# Patient Record
Sex: FEMALE | Race: WHITE | NOT HISPANIC OR LATINO | Employment: OTHER | URBAN - NONMETROPOLITAN AREA
[De-identification: names, ages, dates, MRNs, and addresses within clinical notes are randomized per-mention and may not be internally consistent; named-entity substitution may affect disease eponyms.]

---

## 2020-09-01 ENCOUNTER — OFFICE VISIT (OUTPATIENT)
Dept: CARDIOLOGY | Facility: CLINIC | Age: 55
End: 2020-09-01

## 2020-09-01 VITALS
WEIGHT: 210.6 LBS | RESPIRATION RATE: 16 BRPM | OXYGEN SATURATION: 98 % | DIASTOLIC BLOOD PRESSURE: 96 MMHG | HEART RATE: 85 BPM | SYSTOLIC BLOOD PRESSURE: 162 MMHG | BODY MASS INDEX: 41.35 KG/M2 | TEMPERATURE: 97.3 F | HEIGHT: 60 IN

## 2020-09-01 DIAGNOSIS — I10 ESSENTIAL HYPERTENSION: ICD-10-CM

## 2020-09-01 DIAGNOSIS — R94.31 ABNORMAL EKG: ICD-10-CM

## 2020-09-01 DIAGNOSIS — R09.89 PULMONARY VASCULAR CONGESTION: ICD-10-CM

## 2020-09-01 DIAGNOSIS — R06.02 SHORTNESS OF BREATH: Primary | ICD-10-CM

## 2020-09-01 PROCEDURE — 93000 ELECTROCARDIOGRAM COMPLETE: CPT | Performed by: PHYSICIAN ASSISTANT

## 2020-09-01 PROCEDURE — 99204 OFFICE O/P NEW MOD 45 MIN: CPT | Performed by: PHYSICIAN ASSISTANT

## 2020-09-01 RX ORDER — LOSARTAN POTASSIUM 50 MG/1
50 TABLET ORAL DAILY
COMMUNITY
End: 2020-09-01 | Stop reason: SDUPTHER

## 2020-09-01 RX ORDER — ALBUTEROL SULFATE 90 UG/1
2 AEROSOL, METERED RESPIRATORY (INHALATION) EVERY 4 HOURS PRN
COMMUNITY

## 2020-09-01 RX ORDER — FERROUS SULFATE 325(65) MG
325 TABLET ORAL 2 TIMES DAILY
COMMUNITY

## 2020-09-01 RX ORDER — LOSARTAN POTASSIUM 100 MG/1
100 TABLET ORAL DAILY
Qty: 30 TABLET | Refills: 5 | Status: SHIPPED | OUTPATIENT
Start: 2020-09-01 | End: 2021-03-22

## 2020-09-01 RX ORDER — MONTELUKAST SODIUM 10 MG/1
10 TABLET ORAL NIGHTLY
COMMUNITY

## 2020-09-01 RX ORDER — ATENOLOL 25 MG/1
25 TABLET ORAL DAILY
COMMUNITY

## 2020-09-01 NOTE — PROGRESS NOTES
Subjective   Rizwana Callejas is a 55 y.o. female     Chief Complaint   Patient presents with   • Establish Care       HPI    Patient is a 55-year-old female that presents to the office for evaluation.  Patient has no history of coronary artery disease or structural heart disease.    Patient describes having issue with allergies.  She recently seen primary and had chest x-ray performed demonstrating mild vascular congestion by chest x-ray report.  She has been referred to have further evaluation.    She describes feeling well.  She does not describe chest pain or pressure.  She describes having shortness of breath at times.  It is sporadic and not consistent.  At times she can be short of breath walking outside but then other times she is not.  She has had issues in regards to her hypertension.  She recently was evaluated and describes not being evaluated at the doctor's office in approximately 15 years.  She is hypertensive today.  She does not describe PND orthopnea.    She does not describe palpitations or dysrhythmic symptoms.  Otherwise she is doing well      Current Outpatient Medications   Medication Sig Dispense Refill   • albuterol sulfate  (90 Base) MCG/ACT inhaler Inhale 2 puffs Every 4 (Four) Hours As Needed for Wheezing.     • atenolol (TENORMIN) 25 MG tablet Take 25 mg by mouth Daily.     • ferrous sulfate 325 (65 FE) MG tablet Take 325 mg by mouth 2 (two) times a day.     • losartan (COZAAR) 100 MG tablet Take 1 tablet by mouth Daily. 30 tablet 5   • montelukast (SINGULAIR) 10 MG tablet Take 10 mg by mouth Every Night.       No current facility-administered medications for this visit.        Patient has no known allergies.    Past Medical History:   Diagnosis Date   • Hypertension        Social History     Socioeconomic History   • Marital status: Single     Spouse name: Not on file   • Number of children: Not on file   • Years of education: Not on file   • Highest education level: Not on file    "  Tobacco Use   • Smoking status: Former Smoker     Types: Cigarettes     Last attempt to quit: 9/1/2017     Years since quitting: 3.0   • Smokeless tobacco: Never Used   Substance and Sexual Activity   • Alcohol use: Yes     Frequency: Monthly or less     Drinks per session: 1 or 2     Binge frequency: Never   • Drug use: Never   • Sexual activity: Defer       Family History   Problem Relation Age of Onset   • Hypertension Mother    • Heart disease Mother    • Breast cancer Mother    • Emphysema Mother    • Alcohol abuse Mother    • Lung cancer Father        Review of Systems   Constitutional: Negative.  Negative for fatigue.   HENT: Positive for congestion.    Eyes: Negative.  Negative for visual disturbance.   Respiratory: Positive for shortness of breath. Negative for apnea, cough, chest tightness and wheezing.    Cardiovascular: Negative.  Negative for chest pain, palpitations and leg swelling.   Gastrointestinal: Negative.  Negative for abdominal pain, diarrhea, nausea and vomiting.   Endocrine: Negative.    Genitourinary: Negative.    Musculoskeletal: Negative.    Skin: Negative.    Allergic/Immunologic: Positive for environmental allergies.   Neurological: Positive for headaches. Negative for dizziness, syncope, weakness, light-headedness and numbness.   Hematological: Negative.  Does not bruise/bleed easily.   Psychiatric/Behavioral: Negative.  Negative for confusion and sleep disturbance. The patient is not nervous/anxious.    All other systems reviewed and are negative.      Objective   Vitals:    09/01/20 1502 09/01/20 1521   BP: 170/98 162/96   BP Location: Left arm Right arm   Patient Position: Sitting Sitting   Cuff Size: Large Adult Large Adult   Pulse: 85    Resp: 16    Temp: 97.3 °F (36.3 °C)    TempSrc: Temporal    SpO2: 98%    Weight: 95.5 kg (210 lb 9.6 oz)    Height: 152.4 cm (60\")       /96 (BP Location: Right arm, Patient Position: Sitting, Cuff Size: Large Adult)   Pulse 85   Temp " "97.3 °F (36.3 °C) (Temporal)   Resp 16   Ht 152.4 cm (60\")   Wt 95.5 kg (210 lb 9.6 oz)   SpO2 98%   Breastfeeding No   BMI 41.13 kg/m²     Lab Results (most recent)     None          Physical Exam   Constitutional: She is oriented to person, place, and time. She appears well-developed and well-nourished. No distress.   HENT:   Head: Normocephalic and atraumatic.   Eyes: Conjunctivae are normal. Right eye exhibits no discharge. Left eye exhibits no discharge. No scleral icterus.   Neck: No JVD present.   Cardiovascular: Normal rate, regular rhythm and normal heart sounds. Exam reveals no gallop and no friction rub.   No murmur heard.  Pulmonary/Chest: Effort normal and breath sounds normal. No respiratory distress. She has no wheezes. She has no rales. She exhibits no tenderness.   Abdominal: She exhibits no distension.   Musculoskeletal: She exhibits no edema.   Neurological: She is alert and oriented to person, place, and time. No cranial nerve deficit.   Skin: Skin is warm and dry. No rash noted. No erythema. No pallor.   Psychiatric: She has a normal mood and affect. Her behavior is normal.   Nursing note and vitals reviewed.      Procedure     ECG 12 Lead  Date/Time: 9/1/2020 3:12 PM  Performed by: Esteban Holloway PA  Authorized by: Esteban Holloway PA   Comparison: not compared with previous ECG   Comments: EKG demonstrates sinus rhythm at 73 bpm with nonspecific ST abnormality in the lateral leads at baseline                 Assessment/Plan     Problems Addressed this Visit        Cardiovascular and Mediastinum    Pulmonary vascular congestion    Relevant Orders    Adult Transthoracic Echo Complete W/ Cont if Necessary Per Protocol    Stress Test With Myocardial Perfusion One Day    Essential hypertension    Relevant Medications    atenolol (TENORMIN) 25 MG tablet    losartan (COZAAR) 100 MG tablet    Other Relevant Orders    Adult Transthoracic Echo Complete W/ Cont if Necessary Per Protocol    " Stress Test With Myocardial Perfusion One Day    Abnormal EKG    Relevant Orders    Stress Test With Myocardial Perfusion One Day       Respiratory    Shortness of breath - Primary    Relevant Orders    ECG 12 Lead    Adult Transthoracic Echo Complete W/ Cont if Necessary Per Protocol    Stress Test With Myocardial Perfusion One Day            Recommendation  1.  Patient does not exhibit signs of failure although chest x-ray shows vascular congestion.  I will perform work-up to rule out any evidence of failure based on chest x-ray report and shortness of breath.    2.  Echocardiogram to evaluate LV structure, LV function, assess diastolic performance and evaluate pulmonary pressures    3.  Stress test because of abnormal EKG and dyspnea    4.  Because of her hypertension I am increasing losartan to 100 mg.  We may have to add additional medication such as amlodipine to help with blood pressure control    5.  For now we will see her back for follow-up after testing.  She is to follow with primary as scheduled           Rizwana Callejas  reports that she quit smoking about 3 years ago. Her smoking use included cigarettes. She has never used smokeless tobacco..         Patient's Body mass index is 41.13 kg/m². BMI is above normal parameters. Recommendations include: educational material.         Electronically signed by:

## 2020-09-29 ENCOUNTER — APPOINTMENT (OUTPATIENT)
Dept: CARDIOLOGY | Facility: HOSPITAL | Age: 55
End: 2020-09-29

## 2020-09-29 ENCOUNTER — APPOINTMENT (OUTPATIENT)
Dept: NUCLEAR MEDICINE | Facility: HOSPITAL | Age: 55
End: 2020-09-29

## 2021-03-22 RX ORDER — LOSARTAN POTASSIUM 100 MG/1
TABLET ORAL
Qty: 30 TABLET | Refills: 0 | Status: SHIPPED | OUTPATIENT
Start: 2021-03-22

## 2021-04-22 RX ORDER — LOSARTAN POTASSIUM 100 MG/1
TABLET ORAL
Qty: 30 TABLET | Refills: 0 | OUTPATIENT
Start: 2021-04-22

## 2022-02-28 ENCOUNTER — APPOINTMENT (OUTPATIENT)
Dept: WOMENS IMAGING | Facility: HOSPITAL | Age: 57
End: 2022-02-28

## 2022-02-28 PROCEDURE — 77063 BREAST TOMOSYNTHESIS BI: CPT | Performed by: RADIOLOGY

## 2022-02-28 PROCEDURE — 77067 SCR MAMMO BI INCL CAD: CPT | Performed by: RADIOLOGY
